# Patient Record
Sex: MALE | Race: BLACK OR AFRICAN AMERICAN | NOT HISPANIC OR LATINO | Employment: FULL TIME | URBAN - NONMETROPOLITAN AREA
[De-identification: names, ages, dates, MRNs, and addresses within clinical notes are randomized per-mention and may not be internally consistent; named-entity substitution may affect disease eponyms.]

---

## 2021-02-01 ENCOUNTER — TRANSCRIBE ORDERS (OUTPATIENT)
Dept: PHYSICAL THERAPY | Facility: HOSPITAL | Age: 26
End: 2021-02-01

## 2021-02-01 DIAGNOSIS — M79.642 LEFT HAND PAIN: Primary | ICD-10-CM

## 2021-03-15 ENCOUNTER — HOSPITAL ENCOUNTER (OUTPATIENT)
Dept: PHYSICAL THERAPY | Facility: HOSPITAL | Age: 26
Setting detail: THERAPIES SERIES
Discharge: HOME | End: 2021-03-15

## 2021-03-15 DIAGNOSIS — S66.822D LACERATION OF HAND INVOLVING EXTENSOR TENDON, LEFT, SUBSEQUENT ENCOUNTER: Primary | ICD-10-CM

## 2021-03-15 DIAGNOSIS — M79.642 LEFT HAND PAIN: ICD-10-CM

## 2021-03-15 DIAGNOSIS — S61.412D LACERATION OF HAND INVOLVING EXTENSOR TENDON, LEFT, SUBSEQUENT ENCOUNTER: Primary | ICD-10-CM

## 2021-03-15 PROCEDURE — 97163 PT EVAL HIGH COMPLEX 45 MIN: CPT | Performed by: PHYSICAL THERAPIST
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
## 2021-03-22 ENCOUNTER — TRANSCRIBE ORDERS (OUTPATIENT)
Dept: PHYSICAL THERAPY | Facility: HOSPITAL | Age: 26
End: 2021-03-22

## 2021-03-22 ENCOUNTER — HOSPITAL ENCOUNTER (OUTPATIENT)
Dept: PHYSICAL THERAPY | Facility: HOSPITAL | Age: 26
Setting detail: THERAPIES SERIES
Discharge: HOME | End: 2021-03-22

## 2021-03-22 DIAGNOSIS — S61.412D LACERATION OF HAND INVOLVING EXTENSOR TENDON, LEFT, SUBSEQUENT ENCOUNTER: Primary | ICD-10-CM

## 2021-03-22 DIAGNOSIS — S66.822D LACERATION OF HAND INVOLVING EXTENSOR TENDON, LEFT, SUBSEQUENT ENCOUNTER: ICD-10-CM

## 2021-03-22 DIAGNOSIS — M79.642 LEFT HAND PAIN: ICD-10-CM
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
## 2021-03-22 DIAGNOSIS — S61.412D LACERATION OF HAND INVOLVING EXTENSOR TENDON, LEFT, SUBSEQUENT ENCOUNTER: ICD-10-CM

## 2021-03-22 DIAGNOSIS — S66.822D LACERATION OF HAND INVOLVING EXTENSOR TENDON, LEFT, SUBSEQUENT ENCOUNTER: Primary | ICD-10-CM

## 2021-03-22 PROCEDURE — 97110 THERAPEUTIC EXERCISES: CPT
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
## 2021-03-29 ENCOUNTER — APPOINTMENT (OUTPATIENT)
Dept: PHYSICAL THERAPY | Facility: HOSPITAL | Age: 26
End: 2021-03-29

## 2021-03-31 ENCOUNTER — APPOINTMENT (OUTPATIENT)
Dept: PHYSICAL THERAPY | Facility: HOSPITAL | Age: 26
End: 2021-03-31

## 2021-04-07 ENCOUNTER — APPOINTMENT (OUTPATIENT)
Dept: PHYSICAL THERAPY | Facility: HOSPITAL | Age: 26
End: 2021-04-07